# Patient Record
Sex: FEMALE | Race: ASIAN | NOT HISPANIC OR LATINO | Employment: FULL TIME | URBAN - METROPOLITAN AREA
[De-identification: names, ages, dates, MRNs, and addresses within clinical notes are randomized per-mention and may not be internally consistent; named-entity substitution may affect disease eponyms.]

---

## 2018-01-17 ENCOUNTER — OFFICE VISIT - HEALTHEAST (OUTPATIENT)
Dept: FAMILY MEDICINE | Facility: CLINIC | Age: 23
End: 2018-01-17

## 2018-01-17 DIAGNOSIS — Z23 NEED FOR INFLUENZA VACCINATION: ICD-10-CM

## 2018-01-17 DIAGNOSIS — R31.9 BLOOD IN URINE: ICD-10-CM

## 2018-01-17 DIAGNOSIS — K62.5 RECTAL BLEEDING: ICD-10-CM

## 2018-01-17 LAB
ALBUMIN UR-MCNC: ABNORMAL MG/DL
APPEARANCE UR: CLEAR
BACTERIA #/AREA URNS HPF: ABNORMAL HPF
BILIRUB UR QL STRIP: NEGATIVE
COLOR UR AUTO: YELLOW
GLUCOSE UR STRIP-MCNC: NEGATIVE MG/DL
HGB UR QL STRIP: NEGATIVE
KETONES UR STRIP-MCNC: NEGATIVE MG/DL
LEUKOCYTE ESTERASE UR QL STRIP: NEGATIVE
NITRATE UR QL: NEGATIVE
PH UR STRIP: 7.5 [PH] (ref 5–8)
RBC #/AREA URNS AUTO: ABNORMAL HPF
SP GR UR STRIP: 1.02 (ref 1–1.03)
SQUAMOUS #/AREA URNS AUTO: ABNORMAL LPF
UROBILINOGEN UR STRIP-ACNC: ABNORMAL
WBC #/AREA URNS AUTO: ABNORMAL HPF

## 2018-01-17 ASSESSMENT — MIFFLIN-ST. JEOR: SCORE: 1582.95

## 2018-01-30 ENCOUNTER — RECORDS - HEALTHEAST (OUTPATIENT)
Dept: ADMINISTRATIVE | Facility: OTHER | Age: 23
End: 2018-01-30

## 2018-02-06 ENCOUNTER — RECORDS - HEALTHEAST (OUTPATIENT)
Dept: ADMINISTRATIVE | Facility: OTHER | Age: 23
End: 2018-02-06

## 2018-02-07 ENCOUNTER — COMMUNICATION - HEALTHEAST (OUTPATIENT)
Dept: TELEHEALTH | Facility: CLINIC | Age: 23
End: 2018-02-07

## 2018-02-07 ENCOUNTER — OFFICE VISIT - HEALTHEAST (OUTPATIENT)
Dept: FAMILY MEDICINE | Facility: CLINIC | Age: 23
End: 2018-02-07

## 2018-02-07 DIAGNOSIS — K62.5 RECTAL BLEEDING: ICD-10-CM

## 2018-02-07 DIAGNOSIS — R31.9 BLOOD IN URINE: ICD-10-CM

## 2018-02-07 DIAGNOSIS — Z30.9 CONTRACEPTION MANAGEMENT: ICD-10-CM

## 2018-02-07 LAB
ALBUMIN UR-MCNC: ABNORMAL MG/DL
APPEARANCE UR: ABNORMAL
BACTERIA #/AREA URNS HPF: ABNORMAL HPF
BILIRUB UR QL STRIP: NEGATIVE
COLOR UR AUTO: YELLOW
GLUCOSE UR STRIP-MCNC: NEGATIVE MG/DL
HCG UR QL: NEGATIVE
HGB UR QL STRIP: ABNORMAL
KETONES UR STRIP-MCNC: NEGATIVE MG/DL
LEUKOCYTE ESTERASE UR QL STRIP: NEGATIVE
NITRATE UR QL: NEGATIVE
PH UR STRIP: 6 [PH] (ref 5–8)
RBC #/AREA URNS AUTO: ABNORMAL HPF
SP GR UR STRIP: 1.02 (ref 1–1.03)
SP GR UR STRIP: 1.02 (ref 1–1.03)
SQUAMOUS #/AREA URNS AUTO: ABNORMAL LPF
UROBILINOGEN UR STRIP-ACNC: ABNORMAL
WBC #/AREA URNS AUTO: ABNORMAL HPF

## 2018-02-07 ASSESSMENT — MIFFLIN-ST. JEOR: SCORE: 1595.42

## 2019-02-06 ENCOUNTER — COMMUNICATION - HEALTHEAST (OUTPATIENT)
Dept: FAMILY MEDICINE | Facility: CLINIC | Age: 24
End: 2019-02-06

## 2019-05-01 ENCOUNTER — COMMUNICATION - HEALTHEAST (OUTPATIENT)
Dept: FAMILY MEDICINE | Facility: CLINIC | Age: 24
End: 2019-05-01

## 2019-05-01 DIAGNOSIS — Z30.41 ENCOUNTER FOR SURVEILLANCE OF CONTRACEPTIVE PILLS: ICD-10-CM

## 2020-01-28 ENCOUNTER — COMMUNICATION - HEALTHEAST (OUTPATIENT)
Dept: FAMILY MEDICINE | Facility: CLINIC | Age: 25
End: 2020-01-28

## 2020-01-28 DIAGNOSIS — Z30.41 ENCOUNTER FOR SURVEILLANCE OF CONTRACEPTIVE PILLS: ICD-10-CM

## 2020-02-19 ENCOUNTER — OFFICE VISIT - HEALTHEAST (OUTPATIENT)
Dept: FAMILY MEDICINE | Facility: CLINIC | Age: 25
End: 2020-02-19

## 2020-02-19 DIAGNOSIS — Z76.89 ENCOUNTER TO ESTABLISH CARE: ICD-10-CM

## 2020-02-19 DIAGNOSIS — Z23 IMMUNIZATION DUE: ICD-10-CM

## 2020-02-19 DIAGNOSIS — Z78.9 USES BIRTH CONTROL: ICD-10-CM

## 2020-02-19 DIAGNOSIS — Z12.4 SCREENING FOR CERVICAL CANCER: ICD-10-CM

## 2020-02-19 DIAGNOSIS — Z11.3 SCREEN FOR STD (SEXUALLY TRANSMITTED DISEASE): ICD-10-CM

## 2020-02-19 LAB
CLUE CELLS: NORMAL
TRICHOMONAS, WET PREP: NORMAL
YEAST, WET PREP: NORMAL

## 2020-02-19 ASSESSMENT — MIFFLIN-ST. JEOR: SCORE: 1660.34

## 2020-02-20 LAB
C TRACH DNA SPEC QL PROBE+SIG AMP: NEGATIVE
N GONORRHOEA DNA SPEC QL NAA+PROBE: NEGATIVE

## 2020-07-20 ENCOUNTER — VIRTUAL VISIT (OUTPATIENT)
Dept: FAMILY MEDICINE | Facility: OTHER | Age: 25
End: 2020-07-20

## 2020-07-22 ENCOUNTER — AMBULATORY - HEALTHEAST (OUTPATIENT)
Dept: FAMILY MEDICINE | Facility: CLINIC | Age: 25
End: 2020-07-22

## 2020-07-22 DIAGNOSIS — Z20.822 SUSPECTED COVID-19 VIRUS INFECTION: ICD-10-CM

## 2020-07-22 NOTE — PROGRESS NOTES
"Date: 2020 23:29:26  Clinician: Keren Boggs  Clinician NPI: [id number]  Patient: Bettye Reilly  Patient : 1995  Patient Address: [address], [place] [address]  Patient Phone: [phone]  Visit Protocol: URI  Patient Summary:  Bettye Henderson is a 25 year old ( : 1995 ) female who initiated a Visit for COVID-19 (Coronavirus) evaluation and screening. When asked the question \"Please sign me up to receive news, health information and promotions. \", Bettye Henderson responded \"No\".    Bettye Henderson states her symptoms started 1-2 days ago.   Her symptoms consist of malaise, a headache, chills, a cough, and nasal congestion.   Symptom details     Nasal secretions: The color of her mucus is white and clear.    Cough: Bettye Henderson coughs a few times an hour and her cough is not more bothersome at night. Phlegm comes into her throat when she coughs. She does not believe her cough is caused by post-nasal drip. The color of the phlegm is white, yellow, and clear.     Headache: She states the headache is mild (1-3 on a 10 point pain scale).      Bettye Henderson denies having wheezing, nausea, teeth pain, ageusia, diarrhea, vomiting, rhinitis, ear pain, sore throat, myalgias, anosmia, facial pain or pressure, and fever. She also denies having recent facial or sinus surgery in the past 60 days and taking antibiotic medication in the past month. She is not experiencing dyspnea.   Precipitating events  She has not recently been exposed to someone with influenza. Bettye Henderson has been in close contact with the following high risk individuals: pregnant women and children under the age of 5.   Pertinent COVID-19 (Coronavirus) information  In the past 14 days, Bettye Henderson has not worked in a congregate living setting.   She does not work or volunteer as healthcare worker or a  and does not work or volunteer in a healthcare facility.   Bettye Henderson also has not lived in a congregate living setting in the past 14 days. She " does not live with a healthcare worker.   Bettye Henderson has had a close contact with a laboratory-confirmed COVID-19 patient within 14 days of symptom onset. Additional information about contact with COVID-19 (Coronavirus) patient as reported by the patient (free text): My coworker was tested positive and received her results o  on July 19th. She went to get tested on July 13 because she had all the symptoms. 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 So we are somewhat close to each other when working. We are required to wear masks, but when we go on break we do not wear our masks. After she got tested, she came in to work only 2 days that week, but prior to getting tested , she was working almost everyday for 2 weeks.   Pertinent medical history  Bettye Henderson does not get yeast infections when she takes antibiotics.   Bettye Henderson does not need a return to work/school note.   Weight: 210 lbs   Bettye Henderson does not smoke or use smokeless tobacco.   She denies pregnancy and denies breastfeeding. She has menstruated in the past month.   Weight: 210 lbs    MEDICATIONS: Sprintec (28) oral, ALLERGIES: NKDA  Clinician Response:  Dear Bettye Henderson,   Your symptoms show that you may have coronavirus (COVID-19). This illness can cause fever, cough and trouble breathing. Many people get a mild case and get better on their own. Some people can get very sick.  What should I do?  We would like to test you for this virus.   1. Please call [phone] to schedule your visit. Explain that you were referred by [facility] to have a COVID-19 test. Be ready to share your [facility] visit ID number.  The following will serve as your written order for this COVID Test, ordered by me, for the indication of suspected COVID [Z20.828]: The test will be ordered in [employer], our electronic health record, after you are scheduled. It will show as ordered and authorized by Elroy Doe MD.  Order: COVID-19 (Coronavirus) PCR for SYMPTOMATIC testing from [facility].      2. When  "it's time for your COVID test:  Stay at least 6 feet away from others. (If someone will drive you to your test, stay in the backseat, as far away from the  as you can.)   Cover your mouth and nose with a mask, tissue or washcloth.  Go straight to the testing site. Don't make any stops on the way there or back.      3.Starting now: Stay home and away from others (self-isolate) until:   You've had no fever---and no medicine that reduces fever---for 3 full days (72 hours). And...   Your other symptoms have gotten better. For example, your cough or breathing has improved. And...   At least 10 days have passed since your symptoms started.       During this time, don't leave the house except for testing or medical care.   Stay in your own room, even for meals. Use your own bathroom if you can.   Stay away from others in your home. No hugging, kissing or shaking hands. No visitors.  Don't go to work, school or anywhere else.    Clean \"high touch\" surfaces often (doorknobs, counters, handles, etc.). Use a household cleaning spray or wipes. You'll find a full list of  on the EPA website: www.epa.gov/pesticide-registration/list-n-disinfectants-use-against-sars-cov-2.   Cover your mouth and nose with a mask, tissue or washcloth to avoid spreading germs.  Wash your hands and face often. Use soap and water.  Caregivers in these groups are at risk for severe illness due to COVID-19:  o People 65 years and older  o People who live in a nursing home or long-term care facility  o People with chronic disease (lung, heart, cancer, diabetes, kidney, liver, immunologic)  o People who have a weakened immune system, including those who:   Are in cancer treatment  Take medicine that weakens the immune system, such as corticosteroids  Had a bone marrow or organ transplant  Have an immune deficiency  Have poorly controlled HIV or AIDS  Are obese (body mass index of 40 or higher)  Smoke regularly   o Caregivers should wear " gloves while washing dishes, handling laundry and cleaning bedrooms and bathrooms.  o Use caution when washing and drying laundry: Don't shake dirty laundry, and use the warmest water setting that you can.  o For more tips, go to www.cdc.gov/coronavirus/2019-ncov/downloads/10Things.pdf.    4.Sign up for "[employer]". We know it's scary to hear that you might have COVID-19. We want to track your symptoms to make sure you're okay over the next 2 weeks. Please look for an email from "[employer]"---this is a free, online program that we'll use to keep in touch. To sign up, follow the link in the email. Learn more at [email or web address]  How can I take care of myself?   Get lots of rest. Drink extra fluids (unless a doctor has told you not to).   Take Tylenol (acetaminophen) for fever or pain. If you have liver or kidney problems, ask your family doctor if it's okay to take Tylenol.   Adults can take either:    650 mg (two 325 mg pills) every 4 to 6 hours, or...   1,000 mg (two 500 mg pills) every 8 hours as needed.    Note: Don't take more than 3,000 mg in one day. Acetaminophen is found in many medicines (both prescribed and over-the-counter medicines). Read all labels to be sure you don't take too much.   For children, check the Tylenol bottle for the right dose. The dose is based on the child's age or weight.    If you have other health problems (like cancer, heart failure, an organ transplant or severe kidney disease): Call your specialty clinic if you don't feel better in the next 2 days.       Know when to call 911. Emergency warning signs include:    Trouble breathing or shortness of breath Pain or pressure in the chest that doesn't go away Feeling confused like you haven't felt before, or not being able to wake up Bluish-colored lips or face.  Where can I get more information?    [facility] -- About COVID-19: www."[email or web address]/   CDC -- What to Do If You're Sick:  www.cdc.gov/coronavirus/2019-ncov/about/steps-when-sick.html   CDC -- Ending Home Isolation: www.cdc.gov/coronavirus/2019-ncov/hcp/disposition-in-home-patients.html   [facility] -- Caring for Someone: www.cdc.gov/coronavirus/2019-ncov/if-you-are-sick/care-for-someone.html   [facility] -- Interim Guidance for Hospital Discharge to Home: www.[facility].[place]./diseases/coronavirus/hcp/hospdischarge.pdf   [facility] clinical trials (COVID-19 research studies): clinicalaffairs.[facility]-clinical-trials    Below are the COVID-19 hotlines at the [place] [facility] ([facility]). Interpreters are available.    For health questions: Call 334-869-0897 or 1-360.188.6432 (7 a.m. to 7 p.m.) For questions about schools and childcare: Call 988-477-7440 or 1-638.652.3575 (7 a.m. to 7 p.m.)    Diagnosis: Other malaise  Diagnosis ICD: R53.81

## 2020-07-24 ENCOUNTER — COMMUNICATION - HEALTHEAST (OUTPATIENT)
Dept: FAMILY MEDICINE | Facility: CLINIC | Age: 25
End: 2020-07-24

## 2020-08-19 ENCOUNTER — AMBULATORY - HEALTHEAST (OUTPATIENT)
Dept: FAMILY MEDICINE | Facility: CLINIC | Age: 25
End: 2020-08-19

## 2020-08-19 ENCOUNTER — VIRTUAL VISIT (OUTPATIENT)
Dept: FAMILY MEDICINE | Facility: OTHER | Age: 25
End: 2020-08-19

## 2020-08-19 DIAGNOSIS — Z20.822 SUSPECTED COVID-19 VIRUS INFECTION: ICD-10-CM

## 2020-08-19 NOTE — PROGRESS NOTES
"Date: 2020 14:16:14  Clinician: Alpa Arrieta  Clinician NPI: [id number]  Patient: Bettye Reilly  Patient : 1995  Patient Address: [address], [place] [address]  Patient Phone: [phone]  Visit Protocol: URI  Patient Summary:  Bettye Henderson is a 25 year old ( : 1995 ) female who initiated a Visit for COVID-19 (Coronavirus) evaluation and screening. When asked the question \"Please sign me up to receive news, health information and promotions. \", Bettye Henderson responded \"No\".    Bettye Henderson states her symptoms started gradually 3-4 days ago.   Her symptoms consist of chills, malaise, a cough, and nasal congestion. She is experiencing mild difficulty breathing with activities but can speak normally in full sentences.   Symptom details     Nasal secretions: The color of her mucus is clear.    Cough: Bettye Henderson coughs a few times an hour and her cough is more bothersome at night. Phlegm comes into her throat when she coughs. She believes her cough is caused by post-nasal drip. The color of the phlegm is clear and yellow.      Bettye Henderson denies having ear pain, headache, fever, wheezing, sore throat, teeth pain, ageusia, diarrhea, vomiting, rhinitis, nausea, myalgias, anosmia, and facial pain or pressure. She also denies having recent facial or sinus surgery in the past 60 days, taking antibiotic medication in the past month, and double sickening (worsening symptoms after initial improvement).   Precipitating events  She has not recently been exposed to someone with influenza. Bettye Henderson has been in close contact with the following high risk individuals: people with asthma, heart disease or diabetes and children under the age of 5.   Pertinent COVID-19 (Coronavirus) information  In the past 14 days, Bettye Henderson has not worked in a congregate living setting.   She does not work or volunteer as healthcare worker or a  and does not work or volunteer in a healthcare facility.   Bettye Henderson also " has not lived in a congregate living setting in the past 14 days. She does not live with a healthcare worker.   Bettye Henderson has had a close contact with a laboratory-confirmed COVID-19 patient within 14 days of symptom onset.   Since December 2019, Bettye Henderson and has not had upper respiratory infection or influenza-like illness. Has not been diagnosed with lab-confirmed COVID-19 test   Pertinent medical history  Bettye Henderson does not get yeast infections when she takes antibiotics.   Bettye Henderson needs a return to work/school note.   Weight: 207 lbs   Bettye Henderson does not smoke or use smokeless tobacco.   She denies pregnancy and denies breastfeeding. She has menstruated in the past month.   Weight: 207 lbs    MEDICATIONS: Sprintec (28) oral, ALLERGIES: NKDA  Clinician Response:  Dear Bettye Henderson,    Your symptoms show that you may have coronavirus (COVID-19). This illness can cause fever, cough and trouble breathing. Many people get a mild case and get better on their own. Some people can get very sick.  What should I do?  We would like to test you for this virus.   1. Please call [phone] to schedule your visit. Explain that you were referred by [facility] to have a COVID-19 test. Be ready to share your [facility] visit ID number.  The following will serve as your written order for this COVID Test, ordered by me, for the indication of suspected COVID [Z20.828]: The test will be ordered in [employer], our electronic health record, after you are scheduled. It will show as ordered and authorized by Elroy Doe MD.  Order: COVID-19 (Coronavirus) PCR for SYMPTOMATIC testing from [facility].      2. When it's time for your COVID test:  Stay at least 6 feet away from others. (If someone will drive you to your test, stay in the backseat, as far away from the  as you can.)   Cover your mouth and nose with a mask, tissue or washcloth.  Go straight to the testing site. Don't make any stops on the way there or back.      3.Starting now: Stay home and  "away from others (self-isolate) until:   You've had no fever---and no medicine that reduces fever---for one full day (24 hours). And...   Your other symptoms have gotten better. For example, your cough or breathing has improved. And...   At least 10 days have passed since your symptoms started.       During this time, don't leave the house except for testing or medical care.   Stay in your own room, even for meals. Use your own bathroom if you can.   Stay away from others in your home. No hugging, kissing or shaking hands. No visitors.  Don't go to work, school or anywhere else.    Clean \"high touch\" surfaces often (doorknobs, counters, handles, etc.). Use a household cleaning spray or wipes. You'll find a full list of  on the EPA website: www.epa.gov/pesticide-registration/list-n-disinfectants-use-against-sars-cov-2.   Cover your mouth and nose with a mask, tissue or washcloth to avoid spreading germs.  Wash your hands and face often. Use soap and water.  Caregivers in these groups are at risk for severe illness due to COVID-19:  o People 65 years and older  o People who live in a nursing home or long-term care facility  o People with chronic disease (lung, heart, cancer, diabetes, kidney, liver, immunologic)  o People who have a weakened immune system, including those who:   Are in cancer treatment  Take medicine that weakens the immune system, such as corticosteroids  Had a bone marrow or organ transplant  Have an immune deficiency  Have poorly controlled HIV or AIDS  Are obese (body mass index of 40 or higher)  Smoke regularly   o Caregivers should wear gloves while washing dishes, handling laundry and cleaning bedrooms and bathrooms.  o Use caution when washing and drying laundry: Don't shake dirty laundry, and use the warmest water setting that you can.  o For more tips, go to www.cdc.gov/coronavirus/2019-ncov/downloads/10Things.pdf.    4.Sign up for [employer]. We know it's scary to hear that you " might have COVID-19. We want to track your symptoms to make sure you're okay over the next 2 weeks. Please look for an email from [employer]---this is a free, online program that we'll use to keep in touch. To sign up, follow the link in the email. Learn more at [email or web address]  How can I take care of myself?   Get lots of rest. Drink extra fluids (unless a doctor has told you not to).   Take Tylenol (acetaminophen) for fever or pain. If you have liver or kidney problems, ask your family doctor if it's okay to take Tylenol.   Adults can take either:    650 mg (two 325 mg pills) every 4 to 6 hours, or...   1,000 mg (two 500 mg pills) every 8 hours as needed.    Note: Don't take more than 3,000 mg in one day. Acetaminophen is found in many medicines (both prescribed and over-the-counter medicines). Read all labels to be sure you don't take too much.   For children, check the Tylenol bottle for the right dose. The dose is based on the child's age or weight.    If you have other health problems (like cancer, heart failure, an organ transplant or severe kidney disease): Call your specialty clinic if you don't feel better in the next 2 days.       Know when to call 911. Emergency warning signs include:    Trouble breathing or shortness of breath Pain or pressure in the chest that doesn't go away Feeling confused like you haven't felt before, or not being able to wake up Bluish-colored lips or face.  Where can I get more information?    [facility] -- About COVID-19: [email or web address]   CDC -- What to Do If You're Sick: www.cdc.gov/coronavirus/2019-ncov/about/steps-when-sick.html   CDC -- Ending Home Isolation: www.cdc.gov/coronavirus/2019-ncov/hcp/disposition-in-home-patients.html   CDC -- Caring for Someone: www.cdc.gov/coronavirus/2019-ncov/if-you-are-sick/care-for-someone.html   [facility] -- Interim Guidance for Hospital Discharge to Home:  www.health.[facility].[place].us/diseases/coronavirus/hcp/hospdischarge.pdf   [facility] clinical trials (COVID-19 research studies): clinicalaffairs.[facility]/umn-clinical-trials    Below are the COVID-19 hotlines at the [facility] ([facility]). Interpreters are available.    For health questions: Call 786-317-1918 or 1-490.184.9423 (7 a.m. to 7 p.m.) For questions about schools and childcare: Call 932-445-9858 or 1-737.135.7237 (7 a.m. to 7 p.m.)      Diagnosis: Cough  Diagnosis ICD: R05

## 2020-08-20 ENCOUNTER — AMBULATORY - HEALTHEAST (OUTPATIENT)
Dept: FAMILY MEDICINE | Facility: CLINIC | Age: 25
End: 2020-08-20

## 2020-08-20 DIAGNOSIS — Z20.822 SUSPECTED COVID-19 VIRUS INFECTION: ICD-10-CM

## 2020-08-22 ENCOUNTER — COMMUNICATION - HEALTHEAST (OUTPATIENT)
Dept: FAMILY MEDICINE | Facility: CLINIC | Age: 25
End: 2020-08-22

## 2020-08-23 ENCOUNTER — COMMUNICATION - HEALTHEAST (OUTPATIENT)
Dept: SCHEDULING | Facility: CLINIC | Age: 25
End: 2020-08-23

## 2020-08-23 ENCOUNTER — COMMUNICATION - HEALTHEAST (OUTPATIENT)
Dept: FAMILY MEDICINE | Facility: CLINIC | Age: 25
End: 2020-08-23

## 2020-09-08 ENCOUNTER — COMMUNICATION - HEALTHEAST (OUTPATIENT)
Dept: FAMILY MEDICINE | Facility: CLINIC | Age: 25
End: 2020-09-08

## 2020-09-08 ENCOUNTER — VIRTUAL VISIT (OUTPATIENT)
Dept: FAMILY MEDICINE | Facility: OTHER | Age: 25
End: 2020-09-08

## 2020-09-09 NOTE — PROGRESS NOTES
"Date: 2020 18:57:13  Clinician: Calvin Bailey  Clinician NPI: [id number]  Patient: Bettye Reilly  Patient : 1995  Patient Address: [address], [place] [address]  Patient Phone: [phone]  Visit Protocol: URI  Patient Summary:  Bettye Henderson is a 25 year old ( : 1995 ) female who initiated a Visit for COVID-19 (Coronavirus) evaluation and screening. When asked the question \"Please sign me up to receive news, health information and promotions. \", Bettye Henderson responded \"No\".    Bettye Henderson states her symptoms started gradually 5-6 days ago.   Her symptoms consist of a cough, nasal congestion, a headache, chills, and myalgia. She is experiencing difficulty breathing due to nasal congestion but she is not short of breath.   Symptom details     Nasal secretions: The color of her mucus is white, clear, and yellow.    Cough: Bettye Henderson coughs a few times an hour and her cough is not more bothersome at night. Phlegm comes into her throat when she coughs. She believes her cough is caused by post-nasal drip. The color of the phlegm is clear, yellow, and white.     Headache: She states the headache is mild (1-3 on a 10 point pain scale).      Bettye Henderson denies having ear pain, anosmia, facial pain or pressure, fever, vomiting, rhinitis, nausea, wheezing, sore throat, teeth pain, ageusia, diarrhea, and malaise. She also denies taking antibiotic medication in the past month, having recent facial or sinus surgery in the past 60 days, double sickening (worsening symptoms after initial improvement), and having a sinus infection within the past year.   Precipitating events  She has not recently been exposed to someone with influenza. Bettye Henderson has been in close contact with the following high risk individuals: children under the age of 5 and adults 65 or older.   Pertinent COVID-19 (Coronavirus) information  In the past 14 days, Bettye Henderson has not worked in a congregate living setting.   She does not work or " volunteer as healthcare worker or a  and does not work or volunteer in a healthcare facility.   Bettye Henderson also has not lived in a congregate living setting in the past 14 days. She does not live with a healthcare worker.   Bettye Henderson has had a close contact with a laboratory-confirmed COVID-19 patient within 14 days of symptom onset.   Since December 2019, Bettye Henderson and has not had upper respiratory infection or influenza-like illness. has been diagnosed with lab-confirmed COVID-19 test    Date of her positive COVID-19 test: 08/20/2028    Pertinent medical history  Bettye Henderson does not get yeast infections when she takes antibiotics.   Bettye Henderson does not need a return to work/school note.   Weight: 207 lbs   Bettye Henderson does not smoke or use smokeless tobacco.   She denies pregnancy and denies breastfeeding. She has menstruated in the past month.   Weight: 207 lbs    MEDICATIONS: Sprintec (28) oral, ALLERGIES: NKDA  Clinician Response:  Dear Bettye Henderson,  Based on the information provided, you have a viral upper respiratory infection, otherwise known as a cold. Symptoms vary from person to person, but can include sneezing, coughing, a runny nose, sore throat, and headache and range from mild to severe.  Unfortunately, there are no medications that can cure a cold, so treatment is focused on controlling symptoms as much as possible. Most people gradually feel better until symptoms are gone in 1-2 weeks.  Medication information  Because you have a viral infection, antibiotics will not help you get better. Treating a viral infection with antibiotics could actually make you feel worse.  For more information on why I am not prescribing antibiotics, please watch this video: Antibiotics Aren't Always the Answer.  Unless you are allergic to the over-the-counter medication(s) below, I recommend using:       Acetaminophen (Tylenol or store brand) oral tablet. Take 1-2 tablets by mouth every 4-6 hours to help with the  discomfort.      Ibuprofen (Advil or store brand) 200 mg oral tablet. Take 1-3 tablets (200-600 mg) by mouth every 8 hours to help with the discomfort. Make sure to take the ibuprofen with food. Do not exceed 2400 mg in 24 hours.    A decongestant such as Sudafed PE or store brand.      Guaifenesin + dextromethorphan (Robitussin DM, Mucinex DM, or store brand).    A sinus irrigation kit such as Sinus Rinse, Neti Pot, SinuCleanse, or store brand. Be sure to use sterile or previously boiled water to prevent unwanted infections.     Over-the-counter medications do not require a prescription. Ask the pharmacist if you have any questions.  Self care  Steps you can take to be as comfortable as possible:     Rest.    Drink plenty of fluids.    Take a warm shower to loosen congestion    Use a cool-mist humidifier.    Take a spoonful of honey to reduce your cough.     When to seek care  Please be seen in a clinic or urgent care if any of the following occur:   New symptoms develop, or symptoms become worse   Call ahead before going to the clinic or urgent care.  COVID-19 (Coronavirus) General Information  Because there is currently no vaccine to prevent infection, the best way to protect yourself is to avoid being exposed to this virus. Common symptoms of COVID-19 include but are not limited to fever, cough, and shortness of breath. These symptoms appear 2-14 days after you are exposed to the virus that causes COVID-19. Click here for more information from the CDC on how to protect yourself.  If you are sick with COVID-19 or suspect you are infected with the virus that causes COVID-19, follow the steps here from the CDC to help prevent the disease from spreading to people in your home and community.  Click here for general information from the CDC on testing.  If you develop any of these emergency warning signs for COVID-19, get medical attention immediately:     Trouble breathing    Persistent pain or pressure in the chest     New confusion or inability to arouse    Bluish lips or face      Call your doctor or clinic before going in. Call 911 if you have a medical emergency and notify the  you have or think you may have COVID-19.  For more detailed and up to date information on COVID-19 (Coronavirus), please visit the CDC website.   Diagnosis: Viral URI  Diagnosis ICD: J06.9  Additional Clinician Notes:  If your symptoms are not improving or worsen, please go to one of our urgent care locations for evaluation.

## 2020-09-10 ENCOUNTER — VIRTUAL VISIT (OUTPATIENT)
Dept: FAMILY MEDICINE | Facility: OTHER | Age: 25
End: 2020-09-10

## 2020-09-11 ENCOUNTER — AMBULATORY - HEALTHEAST (OUTPATIENT)
Dept: FAMILY MEDICINE | Facility: CLINIC | Age: 25
End: 2020-09-11

## 2020-09-11 DIAGNOSIS — Z20.822 SUSPECTED COVID-19 VIRUS INFECTION: ICD-10-CM

## 2020-09-11 NOTE — PROGRESS NOTES
"Date: 09/10/2020 19:00:41  Clinician: Reanna Jordan  Clinician NPI: [id number]  Patient: Bettye Reilly  Patient : 1995  Patient Address: [address], [place] [address]  Patient Phone: [phone]  Visit Protocol: URI  Patient Summary:  Bettye Henderson is a 25 year old ( : 1995 ) female who initiated a Visit for COVID-19 (Coronavirus) evaluation and screening. When asked the question \"Please sign me up to receive news, health information and promotions. \", Bettye Henderson responded \"No\".    Bettye Henderson states her symptoms started gradually 5-6 days ago. After her symptoms started, they improved and then got worse again.   Her symptoms consist of facial pain or pressure, nausea, tooth pain, a cough, a headache, chills, malaise, and myalgia. She is experiencing difficulty breathing due to nasal congestion but she is not short of breath.   Symptom details     Cough: eBttye Henderson coughs a few times an hour and her cough is not more bothersome at night. Phlegm comes into her throat when she coughs. She does not believe her cough is caused by post-nasal drip. The color of the phlegm is clear, yellow, and white.     Facial pain or pressure: The facial pain or pressure feels worse when bending over or leaning forward.     Headache: She states the headache is moderate (4-6 on a 10 point pain scale).     Tooth pain: The tooth pain is caused by a cavity, recent dental work, or other mouth problems.      Bettye Henderson denies having ear pain, anosmia, fever, vomiting, rhinitis, wheezing, sore throat, ageusia, diarrhea, and nasal congestion. She also denies taking antibiotic medication in the past month, having recent facial or sinus surgery in the past 60 days, and having a sinus infection within the past year.   Precipitating events  She has not recently been exposed to someone with influenza. Bettye Henderson has been in close contact with the following high risk individuals: children under the age of 5 and pregnant women.   " Pertinent COVID-19 (Coronavirus) information  In the past 14 days, Bettye Henderson has not worked in a congregate living setting.   She does not work or volunteer as healthcare worker or a  and does not work or volunteer in a healthcare facility.   Bettye Henderson also has not lived in a congregate living setting in the past 14 days. She does not live with a healthcare worker.   Bettye Henderson has not had a close contact with a laboratory-confirmed COVID-19 patient within 14 days of symptom onset.   Since December 2019, Bettye Henderson and has not had upper respiratory infection or influenza-like illness. Has not been diagnosed with lab-confirmed COVID-19 test   Pertinent medical history  Bettye Henderson does not get yeast infections when she takes antibiotics.   Bettye Henderson does not need a return to work/school note.   Weight: 207 lbs   Bettye Henderson does not smoke or use smokeless tobacco.   She denies pregnancy and denies breastfeeding. She is currently menstruating.   Weight: 207 lbs    MEDICATIONS: Sprintec (28) oral, ALLERGIES: NKDA  Clinician Response:  Dear Bettye Henderson,   Your symptoms show that you may have coronavirus (COVID-19). This illness can cause fever, cough and trouble breathing. Many people get a mild case and get better on their own. Some people can get very sick.  What should I do?  We would like to test you for this virus.   1. Please call [phone] to schedule your visit. Explain that you were referred by [facility] to have a COVID-19 test. Be ready to share your [facility] visit ID number.  The following will serve as your written order for this COVID Test, ordered by me, for the indication of suspected COVID [Z20.828]: The test will be ordered in [facility], our electronic health record, after you are scheduled. It will show as ordered and authorized by Elroy Doe MD.  Order: COVID-19 (Coronavirus) PCR for SYMPTOMATIC testing from [facility].      2. When it's time for your COVID test:  Stay at least 6 feet away from others. (If  "someone will drive you to your test, stay in the backseat, as far away from the  as you can.)   Cover your mouth and nose with a mask, tissue or washcloth.  Go straight to the testing site. Don't make any stops on the way there or back.      3.Starting now: Stay home and away from others (self-isolate) until:   You've had no fever---and no medicine that reduces fever---for one full day (24 hours). And...   Your other symptoms have gotten better. For example, your cough or breathing has improved. And...   At least 10 days have passed since your symptoms started.       During this time, don't leave the house except for testing or medical care.   Stay in your own room, even for meals. Use your own bathroom if you can.   Stay away from others in your home. No hugging, kissing or shaking hands. No visitors.  Don't go to work, school or anywhere else.    Clean \"high touch\" surfaces often (doorknobs, counters, handles, etc.). Use a household cleaning spray or wipes. You'll find a full list of  on the EPA website: www.epa.gov/pesticide-registration/list-n-disinfectants-use-against-sars-cov-2.   Cover your mouth and nose with a mask, tissue or washcloth to avoid spreading germs.  Wash your hands and face often. Use soap and water.  Caregivers in these groups are at risk for severe illness due to COVID-19:  o People 65 years and older  o People who live in a nursing home or long-term care facility  o People with chronic disease (lung, heart, cancer, diabetes, kidney, liver, immunologic)  o People who have a weakened immune system, including those who:   Are in cancer treatment  Take medicine that weakens the immune system, such as corticosteroids  Had a bone marrow or organ transplant  Have an immune deficiency  Have poorly controlled HIV or AIDS  Are obese (body mass index of 40 or higher)  Smoke regularly   o Caregivers should wear gloves while washing dishes, handling laundry and cleaning bedrooms and " bathrooms.  o Use caution when washing and drying laundry: Don't shake dirty laundry, and use the warmest water setting that you can.  o For more tips, go to www.cdc.gov/coronavirus/2019-ncov/downloads/10Things.pdf.    4.Sign up for [employer]. We know it's scary to hear that you might have COVID-19. We want to track your symptoms to make sure you're okay over the next 2 weeks. Please look for an email from [employer]---this is a free, online program that we'll use to keep in touch. To sign up, follow the link in the email. Learn more at [email or web address]  How can I take care of myself?   Get lots of rest. Drink extra fluids (unless a doctor has told you not to).   Take Tylenol (acetaminophen) for fever or pain. If you have liver or kidney problems, ask your family doctor if it's okay to take Tylenol.   Adults can take either:    650 mg (two 325 mg pills) every 4 to 6 hours, or...   1,000 mg (two 500 mg pills) every 8 hours as needed.    Note: Don't take more than 3,000 mg in one day. Acetaminophen is found in many medicines (both prescribed and over-the-counter medicines). Read all labels to be sure you don't take too much.   For children, check the Tylenol bottle for the right dose. The dose is based on the child's age or weight.    If you have other health problems (like cancer, heart failure, an organ transplant or severe kidney disease): Call your specialty clinic if you don't feel better in the next 2 days.       Know when to call 911. Emergency warning signs include:    Trouble breathing or shortness of breath Pain or pressure in the chest that doesn't go away Feeling confused like you haven't felt before, or not being able to wake up Bluish-colored lips or face.  Where can I get more information?    [facility] -- About COVID-19: [email or web address]   CDC -- What to Do If You're Sick: www.cdc.gov/coronavirus/2019-ncov/about/steps-when-sick.html   CDC -- Ending Home  Isolation: www.cdc.gov/coronavirus/2019-ncov/hcp/disposition-in-home-patients.html   CDC -- Caring for Someone: www.cdc.gov/coronavirus/2019-ncov/if-you-are-sick/care-for-someone.html   [facility] -- Interim Guidance for Hospital Discharge to Home: www.[facility].[place]./diseases/coronavirus/hcp/hospdischarge.pdf   [facility] clinical trials (COVID-19 research studies): clinicalaffairs.[facility]-clinical-trials    Below are the COVID-19 hotlines at the [place] [facility] ([facility]). Interpreters are available.    For health questions: Call 443-411-3186 or 1-189.538.6968 (7 a.m. to 7 p.m.) For questions about schools and childcare: Call 416-298-6656 or 1-826.718.7959 (7 a.m. to 7 p.m.)    Diagnosis: Cough  Diagnosis ICD: R05

## 2020-09-13 ENCOUNTER — COMMUNICATION - HEALTHEAST (OUTPATIENT)
Dept: SCHEDULING | Facility: CLINIC | Age: 25
End: 2020-09-13

## 2020-11-03 ENCOUNTER — VIRTUAL VISIT (OUTPATIENT)
Dept: FAMILY MEDICINE | Facility: OTHER | Age: 25
End: 2020-11-03

## 2020-11-03 NOTE — PROGRESS NOTES
"Date: 2020 08:36:17  Clinician: Bere Jones  Clinician NPI: [id number]  Patient: Bettye Reilly  Patient : 1995  Patient Address: [address], [place] [address]  Patient Phone: [phone]  Visit Protocol: URI  Patient Summary:  Bettye Henderson is a 25 year old ( : 1995 ) female who initiated a [facility] Visit for COVID-19 (Coronavirus) evaluation and screening. When asked the question \"Please sign me up to receive news, health information and promotions. \", Bettye Henderson responded \"No\".    Bettye Henderson states her symptoms started 1-2 days ago.   Her symptoms consist of chills, a sore throat, diarrhea, a headache, a cough, and nausea. She is experiencing mild difficulty breathing with activities but can speak normally in full sentences.   Symptom details     Cough: Bettye Henderson coughs a few times an hour and her cough is more bothersome at night. Phlegm comes into her throat when she coughs. She does not believe her cough is caused by post-nasal drip. The color of the phlegm is white and clear.     Sore throat: Bettye Henderson reports having mild throat pain (1-3 on a 10 point pain scale), does not have exudate on her tonsils, and can swallow liquids. She is not sure if the lymph nodes in her neck are enlarged. A rash has not appeared on the skin since the sore throat started.     Headache: She states the headache is mild (1-3 on a 10 point pain scale).      Bettye Henderson denies having vomiting, rhinitis, facial pain or pressure, myalgias, malaise, teeth pain, ageusia, ear pain, wheezing, fever, nasal congestion, and anosmia. She also denies taking antibiotic medication in the past month and having recent facial or sinus surgery in the past 60 days.   Precipitating events  Within the past week, Bettye Henderson has not been exposed to someone with strep throat. She has not recently been exposed to someone with influenza. Bettye Henderson has been in close contact with the following high risk individuals: adults 65 or older, " pregnant women, children under the age of 5, and people with asthma, heart disease or diabetes.   Pertinent COVID-19 (Coronavirus) information  Bettye Henderson does not work or volunteer as healthcare worker or a . In the past 14 days, Bettye Henderson has not worked or volunteered at a healthcare facility or group living setting.   In the past 14 days, she also has not lived in a congregate living setting.   Bettye Henderson has not had a close contact with a laboratory-confirmed COVID-19 patient within 14 days of symptom onset.    Since December 2019, Bettye Henderson has not been tested for COVID-19 and has not had upper respiratory infection or influenza-like illness.   Pertinent medical history  Bettye Henderson does not get yeast infections when she takes antibiotics.   Bettye Henderson does not need a return to work/school note.   Weight: 200 lbs   Bettye Henderson does not smoke or use smokeless tobacco.   She denies pregnancy and denies breastfeeding. She has menstruated in the past month.   Weight: 200 lbs  Reason for repeat visit for the same protocol within 24 hours:  I took a test a few weeks after getting tested due to being able to get back to work. And i'm not really feeling well and would like to get tested. I have been coughing lately and sometimes I get a sore throat from it.  See the History of referred by protocol and completed visits section for details on previous visits (visits currently in queue to be diagnosed will not appear in this section).    MEDICATIONS: Sprintec (28) oral, ALLERGIES: NKDA  Clinician Response:  Dear Bettye Henderson,   You have written you have tested positive for Covid 19 within the past 90 days. Per the CDC recommendation, we do not retest asymptomatic people within that 90 day period outside of exceptional circumstances.        You do not need to be retested before returning to work or school or other activities.         Your surgeon should not be retesting you before surgeries within the 90 day period of time.          We do retest people who have severe immune compromise. I do not see where you have severe immune compromise.   Why don't we retest: 10 days after symptom begin, people are no longer contagious (or after 20 days, if you have a weak immune system). The tests are frequently positive for up to 90 days long after people are at no risk of giving the virus to other people        After waiting the 10 or 20 days---as long as you're fever-free and feeling better---you may go back to work, school and other activities without having another test.   CDC Guidance can be viewed at: https://www.cdc.gov/coronavirus/2019-ncov/hcp/duration-isolation.html    Diagnosis: Contact with and (suspected) exposure to other viral communicable diseases  Diagnosis ICD: Z20.828

## 2020-11-03 NOTE — PROGRESS NOTES
"Date: 2020 08:10:14  Clinician: Bere Jones  Clinician NPI: [id number]  Patient: Bettye Reilly  Patient : 1995  Patient Address: [address], [place] [address]  Patient Phone: [phone]  Visit Protocol: URI  Patient Summary:  Bettye Henderson is a 25 year old ( : 1995 ) female who initiated a [facility] Visit for COVID-19 (Coronavirus) evaluation and screening. When asked the question \"Please sign me up to receive news, health information and promotions. \", Bettye Henderson responded \"No\".    Bettye Henderson states her symptoms started 1-2 days ago.   Her symptoms consist of myalgia, chills, malaise, diarrhea, a headache, a cough, and nausea. She is experiencing mild difficulty breathing with activities but can speak normally in full sentences.   Symptom details     Cough: Bettye Henderson coughs a few times an hour and her cough is more bothersome at night. Phlegm comes into her throat when she coughs. She does not believe her cough is caused by post-nasal drip. The color of the phlegm is white and clear.     Headache: She states the headache is mild (1-3 on a 10 point pain scale).      Bettye Henderson denies having vomiting, rhinitis, facial pain or pressure, sore throat, teeth pain, ageusia, ear pain, wheezing, fever, nasal congestion, and anosmia. She also denies taking antibiotic medication in the past month and having recent facial or sinus surgery in the past 60 days.   Precipitating events  She has not recently been exposed to someone with influenza. Bettye Henderson has been in close contact with the following high risk individuals: pregnant women and children under the age of 5.   Pertinent COVID-19 (Coronavirus) information  Bettye Henderson does not work or volunteer as healthcare worker or a . In the past 14 days, Bettye Henderson has not worked or volunteered at a healthcare facility or group living setting.   In the past 14 days, she also has not lived in a congregate living setting.   Bettye Henderson has not had a " close contact with a laboratory-confirmed COVID-19 patient within 14 days of symptom onset.    Since December 2019, Bettye Henderson has been tested for COVID-19 and has not had upper respiratory infection or influenza-like illness.      Result of COVID-19 test: Positive      Date of her COVID-19 test: 08/23/2020      Pertinent medical history  Bettye Henderson does not get yeast infections when she takes antibiotics.   Bettye Henderson does not need a return to work/school note.   Weight: 200 lbs   Bettye Henderson does not smoke or use smokeless tobacco.   She denies pregnancy and denies breastfeeding. She has menstruated in the past month.   Weight: 200 lbs    MEDICATIONS: Sprintec (28) oral, ALLERGIES: NKDA  Clinician Response:  Dear Bettye Henderson,   You have written you have tested positive for Covid 19 within the past 90 days. Per the CDC recommendation, we do not retest asymptomatic people within that 90 day period outside of exceptional circumstances.        You do not need to be retested before returning to work or school or other activities.         Your surgeon should not be retesting you before surgeries within the 90 day period of time.         We do retest people who have severe immune compromise. I do not see where you have severe immune compromise.   Why don't we retest: 10 days after symptom begin, people are no longer contagious (or after 20 days, if you have a weak immune system). The tests are frequently positive for up to 90 days long after people are at no risk of giving the virus to other people        After waiting the 10 or 20 days---as long as you're fever-free and feeling better---you may go back to work, school and other activities without having another test.   CDC Guidance can be viewed at: https://www.cdc.gov/coronavirus/2019-ncov/hcp/duration-isolation.html    Diagnosis: Contact with and (suspected) exposure to other viral communicable diseases  Diagnosis ICD: Z20.828

## 2020-12-30 ENCOUNTER — COMMUNICATION - HEALTHEAST (OUTPATIENT)
Dept: FAMILY MEDICINE | Facility: CLINIC | Age: 25
End: 2020-12-30

## 2020-12-30 DIAGNOSIS — Z30.41 ENCOUNTER FOR SURVEILLANCE OF CONTRACEPTIVE PILLS: ICD-10-CM

## 2020-12-30 RX ORDER — NORGESTIMATE AND ETHINYL ESTRADIOL 0.25-0.035
1 KIT ORAL DAILY
Qty: 2 PACKAGE | Refills: 5 | Status: SHIPPED | OUTPATIENT
Start: 2020-12-30 | End: 2022-07-25

## 2021-05-28 NOTE — TELEPHONE ENCOUNTER
RN cannot approve Refill Request    RN can NOT refill this medication overdue for office visits and/or labs.    Nino Ortega, [facility] Triage/Med Refill 5/2/2019    Requested Prescriptions   Pending Prescriptions Disp Refills     SPRINTEC, 28, 0.25-35 mg-mcg per tablet [Pharmacy Med Name: Sprintec 28 Oral Tablet 0.25-35 MG-MCG] 84 tablet 0     Sig: Take 1 tablet by mouth daily.       Oral Contraceptives Protocol Failed - 5/1/2019  3:02 PM        Failed - Visit with PCP or prescribing provider visit in last 12 months      Last office visit with prescriber/PCP: 2/7/2018 Lyle Mehta MD OR same dept: Visit date not found OR same specialty: 2/7/2018 Lyle Mehta MD  Last physical: Visit date not found Last MTM visit: Visit date not found   Next visit within 3 mo: Visit date not found  Next physical within 3 mo: Visit date not found  Prescriber OR PCP: Lyle Mehta MD  Last diagnosis associated with med order: There are no diagnoses linked to this encounter.  If protocol passes may refill for 12 months if within 3 months of last provider visit (or a total of 15 months).

## 2021-05-31 VITALS — BODY MASS INDEX: 33.74 KG/M2 | HEIGHT: 63 IN | WEIGHT: 190.44 LBS

## 2021-06-01 VITALS — WEIGHT: 193.19 LBS | BODY MASS INDEX: 34.23 KG/M2 | HEIGHT: 63 IN

## 2021-06-04 VITALS
BODY MASS INDEX: 38.83 KG/M2 | SYSTOLIC BLOOD PRESSURE: 120 MMHG | OXYGEN SATURATION: 98 % | RESPIRATION RATE: 16 BRPM | WEIGHT: 211 LBS | DIASTOLIC BLOOD PRESSURE: 78 MMHG | HEART RATE: 60 BPM | HEIGHT: 62 IN | TEMPERATURE: 98.1 F

## 2021-06-05 NOTE — TELEPHONE ENCOUNTER
ASSESMENT AND PLAN:  Diagnoses and all orders for this visit:     Contraception management  -     Pregnancy, Urine  -     Urinalysis-UC if Indicated  -     norgestimate-ethinyl estradiol (ORTHO-CYCLEN) 0.25-35 mg-mcg per tablet; Take 1 tablet by mouth daily.  Dispense: 3 Package; Refill: 4  Discussed potential SE including blood clot.     Blood in urine  Started period today, will need to recheck.  No reported gross blood in urine.      Rectal bleeding  Resolved.  Seen by GI.        SUBJECTIVE: Bettye Reilly is here to discuss OCP.  No prior birth control methods used.  Going to [place] to visit .  No known h/o STDs.  States her period started today.

## 2021-06-05 NOTE — TELEPHONE ENCOUNTER
Chart reviewed. Patient has no PCP. Patient needs new primary care provider. CMT left message to let the patient know that she is due to establish care with new provider.

## 2021-06-05 NOTE — TELEPHONE ENCOUNTER
RN cannot approve Refill Request    RN can NOT refill this medication Protocol failed and NO refill given.       Ashly Carmona, [facility] Triage/Med Refill 1/28/2020    Requested Prescriptions   Pending Prescriptions Disp Refills     norgestimate-ethinyl estradiol (SPRINTEC, 28,) 0.25-35 mg-mcg per tablet 1 Package 8     Sig: Take 1 tablet by mouth daily.       Oral Contraceptives Protocol Failed - 1/28/2020  2:10 PM        Failed - Visit with PCP or prescribing provider visit in last 12 months      Last office visit with prescriber/PCP: Visit date not found OR same dept: Visit date not found OR same specialty: 2/7/2018 Lyle Mehta MD  Last physical: Visit date not found Last MTM visit: Visit date not found   Next visit within 3 mo: Visit date not found  Next physical within 3 mo: Visit date not found  Prescriber OR PCP: No Primary Care Provider  Last diagnosis associated with med order: 1. Encounter for surveillance of contraceptive pills  - norgestimate-ethinyl estradiol (SPRINTEC, 28,) 0.25-35 mg-mcg per tablet; Take 1 tablet by mouth daily.  Dispense: 1 Package; Refill: 8    If protocol passes may refill for 12 months if within 3 months of last provider visit (or a total of 15 months).

## 2021-06-05 NOTE — TELEPHONE ENCOUNTER
SONIA was able to reach the patient. The patient would like to establish care with Dr. Hernández 2/19/2020 at 10:00 AM and she has been scheduled. The patient states that she is still using the Sprintec without issue and does need to request a temporary refill.

## 2021-06-06 NOTE — PROGRESS NOTES
"Bettye Reilly is a 25 y.o. female here for [facility] care.     ASSESSMENT/PLAN:   Bettye was seen today for [facility] care.    Diagnoses and all orders for this visit:    Encounter to establish care    Screening for cervical cancer  Screen for STD (sexually transmitted disease)  -     Wet Prep, Vaginal  -     Chlamydia trachomatis & Neisseria gonorrhoeae, Amplified Detection  -     Gynecologic Cytology (PAP Smear)    Immunization due  -     Td, Preservative Free (green label)    Uses birth control  Has 2 refills left, will refill when she needs it.       Return in about 1 year (around 2/19/2021) for Annual physical.       ======================================================    SUBJECTIVE  Bettye Reilly is a 25 y.o. female here for [facility] care.   She is otherwise healthy, on birth control pills. No concerns.     No PMH.     Social History.   Never smoker. No alcohol use. No other drugs. No herbal medications.     Surgical Hx: none    Family history.   HTN - dad    ROS  Complete 10 point review of systems negative except as noted above in HPI    Reviewed Past Medical History, Medications, Family History and Social History in Epic and up to date with no new changes.    OBJECTIVE  /78   Pulse 60   Temp 98.1  F (36.7  C) (Oral)   Resp 16   Ht 5' 2\" (1.575 m)   Wt 211 lb (95.7 kg)   LMP 01/26/2020 (Within Days)   SpO2 98%   BMI 38.59 kg/m       General: Cooperative, pleasant, in no acute distress  HEENT: PERRL, EOMI. Esotropia.  TM normal bilaterally.  Pharynx normal without erythema.  Tonsils normal without hypertrophy or exudates.  Neck: no lymphadenopathy, no masses  CV: RRR, normal S1/S2, no murmur, rubs, gallops  Resp: No respiratory distress. Clear to auscultation bilaterally. No wheezes, rales, rhonchi  Abd: Nontender, nondistended, bowel sounds present   (female): External genitalia is without lesions. Introitus is normal, vaginal walls pink and moist without lesions or evidence of trauma. No " cervical motion tenderness. No adnexal masses. No cervical lesions or discharge  Ext: radial/pedal pulses +2 bilaterally  MSK: Normal muscle tone  Neuro: CN II-XII intact  Skin: warm, well perfused. No rashes  Psych: No suicidal or homicidal ideations, no self-harm.  Normal affect.    LABS & IMAGES   Results for orders placed or performed in visit on 02/19/20   Wet Prep, Vaginal   Result Value Ref Range    Yeast Result No yeast seen No yeast seen    Trichomonas No Trichomonas seen No Trichomonas seen    Clue Cells, Wet Prep No Clue cells seen No Clue cells seen         ======================================================    Options for treatment and follow-up care were reviewed with the patient. Bettye Santiago Reilly and/or guardian was engaged and actively involved in the decision making process. Bettye Santiago Reilly and/or guardian verbalized understanding of the options discussed and was satisfied with the final plan.      Андрей Hernández MD

## 2021-06-10 NOTE — TELEPHONE ENCOUNTER
"Coronavirus (COVID-19) Notification    Patient Bettye Reilly  Reason for call  Notify of Positive Coronavirus (COVID-19) lab results, assess symptoms,  review [facility] recommendations    Lab Result    Lab test:  2019-nCoV rRt-PCR or SARS-CoV-2 PCR    Oropharyngeal AND/OR nasopharyngeal swabs is POSITIVE for 2019-nCoV RNA/SARS-COV-2 PCR (COVID-19 virus)    Instructions per [facility] Coronavirus COVID-19 recommendations    Brief introduction script  Introduce self and then review script:  \"I am calling on behalf of [employer].  We were notified that your Coronavirus test (COVID-19) for was POSITIVE for the virus.  I have some information to relay to you but first I wanted to mention that the [place] Dept of Health will be contacting you shortly [it's possible MD already called Patient] to talk to you more about how you are feeling and other people you have had contact with who might now also have the virus.  Also, [facility] is Partnering with the [facility] for Covid-19 research, you may be contacted directly by research staff.\"    ssessment (Inquire about Patient's current symptoms)   Assessment   Current Symptoms at time of phone call: (if no symptoms, document No symptoms] None   Symptom onset (if applicable) 8/17/2020     If at time of call, Patients symptoms hare worsened, the Patient should contact 911 or have someone drive them to Emergency Dept promptly:      If Patient calling 911, inform 911 personal that you have tested positive for the Coronavirus (COVID-19).  Place mask on and await 911 to arrive.    If Emergency Dept, If possible, please have another adult drive you to the Emergency Dept but you need to wear mask when in contact with other people.      Review information with Patient    Your result was positive. This means you have COVID-19 (coronavirus).  We have sent you a letter that reviews the information that I'll be reviewing with you now.    How can I protect " others?    If you have symptoms: stay home and away from others (self-isolate) until:    You've had no fever--and no medicine that reduces fever--for 3 full days (72 hours). And      Your other symptoms have gotten better. For example, your cough or breathing has improved. And     At least 10 days have passed since your symptoms started.    If you don't have symptoms: Stay home and away from others (self-isolate) until at least 10 days have passed since your first positive COVID-19 test. (Date test collected).    During this time:    Stay in your own room, including for meals. Use your own bathroom if you can.    Stay away from others in your home. No hugging, kissing or shaking hands. No visitors.     Don't go to work, school or anywhere else.     Clean  high touch  surfaces often (doorknobs, counters, handles, etc.). Use a household cleaning spray or wipes. You'll find a full list on the EPA website at www.epa.gov/pesticide-registration/list-n-disinfectants-use-against-sars-cov-2.     Cover your mouth and nose with a mask, tissue or washcloth to avoid spreading germs.    Wash your hands and face often with soap and water.    Caregivers in these groups are at risk for severe illness due to COVID-19:  o People 65 years and older  o People who live in a nursing home or long-term care facility  o People with chronic disease (lung, heart, cancer, diabetes, kidney, liver, immunologic)  o People who have a weakened immune system, including those who:  - Are in cancer treatment  - Take medicine that weakens the immune system, such as corticosteroids  - Had a bone marrow or organ transplant  - Have an immune deficiency  - Have poorly controlled HIV or AIDS  - Are obese (body mass index of 40 or higher)  - Smoke regularly    Caregivers should wear gloves while washing dishes, handling laundry and cleaning bedrooms and bathrooms.    Wash and dry laundry with special caution. Don't shake dirty laundry, and use the warmest  water setting you can.    If you have a weakened immune system, ask your doctor about other actions you should take.    For more tips, go to www.cdc.gov/coronavirus/2019-ncov/downloads/10Things.pdf.    You should not go back to work until you meet the guidelines above for ending your home isolation. You should meet these along with any other guidelines that your employer has.    Employers: This document serves as formal notice of your employee's medical guidelines for going back to work. They must meet the above guidelines before going back to work in person.    How can I take care of myself?    1. Get lots of rest. Drink extra fluids (unless a doctor has told you not to).    2. Take Tylenol (acetaminophen) for fever or pain. If you have liver or kidney problems, ask your family doctor if it's okay to take Tylenol.     Take either:     650 mg (two 325 mg pills) every 4 to 6 hours, or     1,000 mg (two 500 mg pills) every 8 hours as needed.     Note: Don't take more than 3,000 mg in one day. Acetaminophen is found in many medicines (both prescribed and over-the-counter medicines). Read all labels to be sure you don't take too much.    For children, check the Tylenol bottle for the right dose (based on their age or weight).    3. If you have other health problems (like cancer, heart failure, an organ transplant or severe kidney disease): Call your specialty clinic if you don't feel better in the next 2 days.    4. Know when to call 911: Emergency warning signs include:    Trouble breathing or shortness of breath    Pain or pressure in the chest that doesn't go away    Feeling confused like you haven't felt before, or not being able to wake up    Bluish-colored lips or face    5. Sign up for [facility]. We know it's scary to hear that you have COVID-19. We want to track your symptoms to make sure you're okay over the next 2 weeks. Please look for an email from [employer] [facility]--this is a free, online program that we'll  use to keep in touch. To sign up, follow the link in the email. Learn more at [email or web address].    Where can I get more information?    [facility]: [email or web address]    Coronavirus Basics: www.health.[facility].[place]/diseases/coronavirus/basics.html    What to Do If You're Sick: www.cdc.gov/coronavirus/2019-ncov/about/steps-when-sick.html    Ending Home Isolation: www.cdc.gov/coronavirus/2019-ncov/hcp/disposition-in-home-patients.html     Caring for Someone with COVID-19: www.cdc.gov/coronavirus/2019-ncov/if-you-are-sick/care-for-someone.html     [facility] clinical trials (COVID-19 research studies): [email or web address]     A Positive COVID-19 letter will be sent via [employer] or the Mail    [Name]  Alpa Mccray LPN

## 2021-06-15 NOTE — PROGRESS NOTES
"ASSESMENT AND PLAN:  Diagnoses and all orders for this visit:    Blood in urine  -     Urinalysis-UC if Indicated  UA - negative blood, 3-5 RBC.   Will repeat in a few weeks.  Will consider urology referral if needed.    Rectal bleeding  -     Ambulatory referral to Gastroenterology    Need for influenza vaccination  -     Influenza, Seasonal,Quad Inj, 36+ MOS        SUBJECTIVE: Bettye Reilly is here with blood in the urine and blood in the stool for 1 week.     Blood in the stool -    Noticed blood in the stool 1 week ago, fresh blood, wipe type. Denied constipation or diarrhea. No known history of hemorrhoid.  Blood in the urine- LMP more than 2 weeks ago. Noticed bright red blood when she wiped after urinating 1 week ago.  Occurs on and off for 1 week.  Denied dysuria, frequency, fever or chills.  Denied back pain.  No history of blood in the urine in the past.    No past medical history on file.  Patient Active Problem List   Diagnosis     Esotropia       Allergies:  No Known Allergies    History   Smoking Status     Never Smoker   Smokeless Tobacco     Never Used       Review of systems otherwise negative except as listed in HPI.   History   Smoking Status     Never Smoker   Smokeless Tobacco     Never Used       OBJECTICE: /82 (Patient Site: Right Arm, Patient Position: Sitting, Cuff Size: Adult Regular)  Pulse 76  Temp 98.3  F (36.8  C) (Oral)   Resp 16  Ht 5' 3\" (1.6 m)  Wt 190 lb 7 oz (86.4 kg)  LMP 12/27/2017 (Exact Date)  Breastfeeding? No  BMI 33.73 kg/m2    DATA REVIEWED:    Labs Reviewed or Ordered (1):       GEN-alert,  in no apparent distress.  HEENT-mucous membranes are moist, neck is supple.  CV-regular rate and rhythm with no murmur.   RESP-lungs clear to auscultation .  ABDOMEN- Soft , not tender.  RECTAL-normal external hemorrhoid noted.  No active bleeding.  No fresh blood on the examining finger.  SKIN-normal        Lyle Mehta   1/17/2018     "

## 2021-06-15 NOTE — PROGRESS NOTES
"ASSESMENT AND PLAN:  Diagnoses and all orders for this visit:    Contraception management  -     Pregnancy, Urine  -     Urinalysis-UC if Indicated  -     norgestimate-ethinyl estradiol (ORTHO-CYCLEN) 0.25-35 mg-mcg per tablet; Take 1 tablet by mouth daily.  Dispense: 3 Package; Refill: 4  Discussed potential SE including blood clot.    Blood in urine  Started period today, will need to recheck.  No reported gross blood in urine.     Rectal bleeding  Resolved.  Seen by GI.      SUBJECTIVE: Bettye Reilly is here to discuss OCP.  No prior birth control methods used.  Going to [place] to visit .  No known h/o STDs.  States her period started today.      No past medical history on file.  Patient Active Problem List   Diagnosis     Esotropia       Allergies:  No Known Allergies    History   Smoking Status     Never Smoker   Smokeless Tobacco     Never Used       Review of systems otherwise negative except as listed in HPI.   History   Smoking Status     Never Smoker   Smokeless Tobacco     Never Used       OBJECTICE: /84 (Patient Site: Right Arm, Patient Position: Sitting, Cuff Size: Adult Regular)  Pulse 60  Temp 98.1  F (36.7  C) (Oral)   Resp 16  Ht 5' 3\" (1.6 m)  Wt 193 lb 3 oz (87.6 kg)  LMP 12/27/2017  BMI 34.22 kg/m2    DATA REVIEWED:    Labs Reviewed or Ordered (1):       GEN-alert,  in no apparent distress.  HEENT- neck is supple.  CV-regular rate and rhythm with no murmur.   RESP-lungs clear to auscultation .          Lyle Mehta   2/7/2018     "

## 2021-06-16 PROBLEM — Z78.9 USES BIRTH CONTROL: Status: ACTIVE | Noted: 2020-02-19

## 2021-06-20 NOTE — LETTER
Letter by Андрей Hernández MD at      Author: Андрей Hernández MD Service: -- Author Type: --    Filed:  Encounter Date: 2/19/2020 Status: (Other)         February 19, 2020     Patient: Bettye Reilly   YOB: 1995   Date of Visit: 2/19/2020       To Whom It May Concern:    Bettye Reilly was seen in my clinic today, 02/19/20. It is my medical opinion that Bettye Reilly may return to full duty immediately with no restrictions.    If you have any questions or concerns, please don't hesitate to call.    Sincerely,        Electronically signed by Андрей Hernández MD

## 2021-06-20 NOTE — LETTER
Letter by Jessica Miller RN at      Author: Jessica Miller RN Service: -- Author Type: --    Filed:  Encounter Date: 7/24/2020 Status: (Other)       7/24/2020        Bettye Reilly  [address] [place] [address]    2019-nCOV   Date Value Ref Range Status   07/22/2020 Not Detected  Final     Comment:     Collection of multiple specimens from the same patient may be necessary to  detect the virus. The possibility of a false negative should be considered if  the patient's recent exposure or clinical presentation suggests 2019 nCOV  infection and diagnostic tests for other causes of illness are negative. Repeat  testing may be considered in this setting.  Viral RNA was extracted via a validated method and subsequently underwent  single step reverse transcriptase-real time polymerase chain reaction using  primers to the CDC specified N1,N2 gene targets of CoV2 and human RNP as an  internal control.  A negative result does not rule out the presence of real-time PCR inhibitors in  the specimen or COVID-19 RNA in concentrations below the limit of detection of  the assay. The possibility of a false negative should be considered if the  patients recent exposure or clinical presentation suggests COVID-19. Additional  testing or repeat testing requires consultation with the laboratory.  Nasopharyngeal specimen is the preferred choice for swab-based SARS CoV2  testing. When collection of a nasopharyngeal swab is not possible the following  are acceptable alternatives:  an oropharyngeal (OP) specimen collected by a healthcare professional, or a  nasal mid-turbinate (NMT) swab collected by a healthcare professional or by  onsite self-collection (using a flocked tapered swab), or an anterior nares  specimen collected by a healthcare professional or by onsite self-collection  (using a round foam swab). (Centers for Disease Control)  Testing performed by [facility], Room [address], [address]  [place] [address]. This test was developed and its  performance characteristics determined by the [facility]. It has not been cleared or approved by the FDA.  The laboratory is regulated under the Clinical Laboratory Improvement  Amendments of 1988 (CLIA-88) as qualified to perform high-complexity testing.  This test is used for clinical purposes. It should not be regarded as  investigational or for research.    Performed and/or entered by:  [address] [address]        No results found for: [id number]    This letter provides a written record that you were tested for COVID-19.    Your result was negative. This means that we didnt find the virus that causes COVID-19 in your sample. A test may show negative when you do actually have the virus. This can happen when the virus is in the early stages of infection, before you feel illness symptoms.    If you have symptoms   Stay home and away from others (self-isolate) until you meet ALL of the guidelines below:    Youve had no fever--and no medicine that reduces fever--for 3 full days (72 hours). And ?    Your other symptoms have gotten better. For example, your cough or breathing has improved. And?    At least 10 days have passed since your symptoms started.    During this time:    Stay home. Dont go to work, school or anywhere else.     Stay in your own room, including for meals. Use your own bathroom if you can.    Stay away from others in your home. No hugging, kissing or shaking hands. No visitors.    Clean high touch surfaces often (doorknobs, counters, handles, etc.). Use a household cleaning spray or wipes. You can find a full list on the EPA website at www.epa.gov/pesticide-registration/list-n-disinfectants-use-against-sars-cov-2.    Cover your mouth and nose with a mask, tissue or washcloth to avoid spreading germs.    Wash your hands and face often with soap  and water.    Going back to work  Check with your employer for any guidelines to follow for going back to work.    Employers: This document serves as formal notice that your employee tested negative for COVID-19, as of the testing date shown above.

## 2021-06-23 NOTE — TELEPHONE ENCOUNTER
Called pt to relay BC sent to pharmacy.  Pt needs to schedule a PE before May with PCP.  When pt calls back, please assist pt to schedule PE. Thanks.

## 2021-06-23 NOTE — TELEPHONE ENCOUNTER
RN cannot approve Refill Request    RN can NOT refill this medication PCP messaged that patient is overdue for Office Visit and DXA Scan.     Ashly Carmona, [facility] Triage/Med Refill 2/7/2019    Requested Prescriptions   Pending Prescriptions Disp Refills     norgestimate-ethinyl estradiol (ORTHO-CYCLEN) 0.25-35 mg-mcg per tablet 3 Package 4     Sig: Take 1 tablet by mouth daily.    Oral Contraceptives Protocol Failed - 2/6/2019  8:29 AM       Failed - Visit with PCP or prescribing provider visit in last 12 months     Last office visit with prescriber/PCP: Visit date not found OR same dept: 2/7/2018 Lyle Mehta MD OR same specialty: 2/7/2018 Lyle Mehta MD  Last physical: Visit date not found Last MTM visit: Visit date not found   Next visit within 3 mo: Visit date not found  Next physical within 3 mo: Visit date not found  Prescriber OR PCP: No Primary Care Provider  Last diagnosis associated with med order: There are no diagnoses linked to this encounter.  If protocol passes may refill for 12 months if within 3 months of last provider visit (or a total of 15 months).

## 2021-08-15 ENCOUNTER — HEALTH MAINTENANCE LETTER (OUTPATIENT)
Age: 26
End: 2021-08-15

## 2021-10-11 ENCOUNTER — HEALTH MAINTENANCE LETTER (OUTPATIENT)
Age: 26
End: 2021-10-11

## 2022-07-25 ENCOUNTER — OFFICE VISIT (OUTPATIENT)
Dept: URGENT CARE | Facility: URGENT CARE | Age: 27
End: 2022-07-25
Payer: COMMERCIAL

## 2022-07-25 VITALS
SYSTOLIC BLOOD PRESSURE: 135 MMHG | RESPIRATION RATE: 16 BRPM | TEMPERATURE: 98.4 F | HEART RATE: 76 BPM | OXYGEN SATURATION: 98 % | WEIGHT: 237.38 LBS | BODY MASS INDEX: 43.42 KG/M2 | DIASTOLIC BLOOD PRESSURE: 92 MMHG

## 2022-07-25 DIAGNOSIS — J02.9 ACUTE PHARYNGITIS, UNSPECIFIED ETIOLOGY: Primary | ICD-10-CM

## 2022-07-25 LAB
DEPRECATED S PYO AG THROAT QL EIA: NEGATIVE
GROUP A STREP BY PCR: NOT DETECTED

## 2022-07-25 PROCEDURE — 99213 OFFICE O/P EST LOW 20 MIN: CPT | Performed by: NURSE PRACTITIONER

## 2022-07-25 PROCEDURE — 87651 STREP A DNA AMP PROBE: CPT | Performed by: NURSE PRACTITIONER

## 2022-07-25 RX ORDER — RNA INGREDIENT BNT-162B2 0.23 G/1.8ML
INJECTION, SUSPENSION INTRAMUSCULAR
COMMUNITY
Start: 2021-11-15 | End: 2022-07-25

## 2022-07-25 NOTE — PROGRESS NOTES
"Assessment & Plan       Remedios Tyler, BUDDY CNP  [facility] [place]    Subjective     Bettye is a 27 year old female who presents to clinic today for the following health issues:  Chief Complaint   Patient presents with     Throat Problem     Has been losing her voice since this past Saturday, yesterday it was totally gone. Slight sore throat. No fever/chills. Cough present.     Urgent Care     Urgent care visit for laryngitis and URI sx of cough and slight sore throat.     Likely viral in etiology. Home care reviewed. Patient verbalized understanding; will monitor symptoms closely. Reviewed s/e to medications.   Follow up with primary care in 1 week if symptoms not improving.     Handout given from epic and reviewed.      Patient Instructions   Drink plenty of fluids and rest.  May use salt water gargles- about 8 oz warm water with about 1 teaspoon salt  Sucrets and Cepacol spray are over the counter medications that numb the throat.  Over the counter pain relievers such as tylenol or ibuprofen may be used as needed.   Honey lemon tea helps to soothe the throat. \"Throat Coat\" tea is soothing as well.  Change toothbrush after 24 hours of antibiotics (may soak in 3-6% hydrogen peroxide)    Please follow up with primary care provider if symptoms are not improving, worsening or new symptoms or for any adverse reactions to medications.           HPI    Patient presents to clinic with 3-day history of pharyngitis.  Denies fever.  Mild frontal headache cough that is nonproductive.  She did take a at home COVID test this was negative      Review of Systems  Constitutional, HEENT, cardiovascular, pulmonary, gi and gu systems are negative, except as otherwise noted.      Objective    BP (!) 135/92 (BP Location: Right arm, Patient Position: Sitting)   Pulse 76   Temp 98.4  F (36.9  C) (Tympanic)   Resp 16   Wt 107.7 kg (237 lb 6 oz)   SpO2 98%   Breastfeeding No   BMI 43.42 kg/m    Physical Exam "   GENERAL: alert and no distress  EYES: Eyes grossly normal to inspection, PERRL and conjunctivae and sclerae normal  HENT: normal cephalic/atraumatic, ear canals and TM's normal, nose and mouth without ulcers or lesions, nasal mucosa edematous , oropharynx clear, oral mucous membranes moist and tonsillar erythema  NECK: bilateral anterior cervical adenopathy, no asymmetry, masses, or scars and thyroid normal to palpation  RESP: lungs clear to auscultation - no rales, rhonchi or wheezes  CV: regular rate and rhythm, normal S1 S2, no S3 or S4, no murmur, click or rub, no peripheral edema and peripheral pulses strong  MS: no gross musculoskeletal defects noted, no edema  SKIN: no suspicious lesions or rashes    Results for orders placed or performed in visit on 07/25/22   Streptococcus A Rapid Screen w/Reflex to PCR - Clinic Collect     Status: Normal    Specimen: Throat; Swab   Result Value Ref Range    Group A Strep antigen Negative Negative

## 2022-07-25 NOTE — PATIENT INSTRUCTIONS
"Drink plenty of fluids and rest.  May use salt water gargles- about 8 oz warm water with about 1 teaspoon salt  Sucrets and Cepacol spray are over the counter medications that numb the throat.  Over the counter pain relievers such as tylenol or ibuprofen may be used as needed.   Honey lemon tea helps to soothe the throat. \"Throat Coat\" tea is soothing as well.  Change toothbrush after 24 hours of antibiotics (may soak in 3-6% hydrogen peroxide)    Please follow up with primary care provider if symptoms are not improving, worsening or new symptoms or for any adverse reactions to medications.     "

## 2022-07-25 NOTE — LETTER
[facility] [place]  [address] [facility] [address]  Phone: [phone]    July 25, 2022        Bettye eRilly  [address] [place] [address]          To whom it may concern:    RE: Bettye Reilly    Patient was seen and treated today at our clinic due to symptoms of viral infection that is not covid related. Recommend home for 1-3 additional days until symptoms improving and voice returning.       Please contact me for questions or concerns.      Sincerely,        RemediosBUDDY Whitman CNP

## 2022-07-26 NOTE — RESULT ENCOUNTER NOTE
Results discussed with patient in clinic. States understanding of these results.    Remedios Tyler CNP

## 2022-08-06 ENCOUNTER — MYC MEDICAL ADVICE (OUTPATIENT)
Dept: FAMILY MEDICINE | Facility: CLINIC | Age: 27
End: 2022-08-06

## 2022-09-24 ENCOUNTER — HEALTH MAINTENANCE LETTER (OUTPATIENT)
Age: 27
End: 2022-09-24

## 2023-10-14 ENCOUNTER — HEALTH MAINTENANCE LETTER (OUTPATIENT)
Age: 28
End: 2023-10-14

## 2024-12-01 ENCOUNTER — HEALTH MAINTENANCE LETTER (OUTPATIENT)
Age: 29
End: 2024-12-01